# Patient Record
Sex: MALE | Race: ASIAN | NOT HISPANIC OR LATINO | Employment: FULL TIME | URBAN - METROPOLITAN AREA
[De-identification: names, ages, dates, MRNs, and addresses within clinical notes are randomized per-mention and may not be internally consistent; named-entity substitution may affect disease eponyms.]

---

## 2019-02-16 ENCOUNTER — APPOINTMENT (OUTPATIENT)
Dept: RADIOLOGY | Age: 24
End: 2019-02-16
Attending: PHYSICIAN ASSISTANT
Payer: COMMERCIAL

## 2019-02-16 ENCOUNTER — OFFICE VISIT (OUTPATIENT)
Dept: URGENT CARE | Age: 24
End: 2019-02-16
Payer: COMMERCIAL

## 2019-02-16 VITALS
WEIGHT: 130 LBS | OXYGEN SATURATION: 97 % | SYSTOLIC BLOOD PRESSURE: 132 MMHG | HEART RATE: 91 BPM | HEIGHT: 68 IN | RESPIRATION RATE: 18 BRPM | BODY MASS INDEX: 19.7 KG/M2 | TEMPERATURE: 98.7 F | DIASTOLIC BLOOD PRESSURE: 54 MMHG

## 2019-02-16 DIAGNOSIS — S52.501A CLOSED FRACTURE OF DISTAL END OF RIGHT RADIUS, UNSPECIFIED FRACTURE MORPHOLOGY, INITIAL ENCOUNTER: ICD-10-CM
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
## 2019-02-16 DIAGNOSIS — S69.92XA INJURY OF LEFT WRIST, INITIAL ENCOUNTER: ICD-10-CM

## 2019-02-16 DIAGNOSIS — S62.031A CLOSED DISPLACED FRACTURE OF PROXIMAL THIRD OF SCAPHOID BONE OF RIGHT WRIST, INITIAL ENCOUNTER: Primary | ICD-10-CM

## 2019-02-16 PROCEDURE — 73130 X-RAY EXAM OF HAND: CPT

## 2019-02-16 PROCEDURE — 29125 APPL SHORT ARM SPLINT STATIC: CPT | Performed by: PHYSICIAN ASSISTANT

## 2019-02-16 PROCEDURE — G0383 LEV 4 HOSP TYPE B ED VISIT: HCPCS | Performed by: FAMILY MEDICINE

## 2019-02-16 PROCEDURE — 73110 X-RAY EXAM OF WRIST: CPT

## 2019-02-16 NOTE — PATIENT INSTRUCTIONS
Rest injured body part(s)  Ice - Cold compresses 10 min every 2-3 hours while awake for the 1st 48 hr   After 48 hr may do warm compresses for comfort if needed  Compression with Ace wrap and/or splint for protection and comfort  Do not wear any Ace wraps to bed  Elevate injured body part(s) if able  Tylenol as needed for pain  May also use ibuprofen if needed      Call orthopedist as soon as possible and arrange follow-up evaluation for broken wrist

## 2019-02-16 NOTE — PROGRESS NOTES
[facility]    NAME: Sergio Bell is a 24 y o  male  : 1995    MRN: [id number]  DATE: 2019  TIME: 3:26 PM    Assessment and Plan   Closed displaced fracture of proximal third of scaphoid bone of right wrist, initial encounter [[id number]]  1  Closed displaced fracture of proximal third of scaphoid bone of right wrist, initial encounter  XR hand 3+ vw right    XR wrist 3+ vw right   2  Fall, initial encounter     3  Closed fracture of distal end of right radius, unspecified fracture morphology, initial encounter       X-ray right hand:  Navicular fracture with some displacement  X-ray right wrist:  Possible distal radial fracture  Faint lucency seen that could indicate acute trauma  Await radiologist's final test results  Request x-ray disc to be made and given to patient to take with him to orthopedist in area where he lives  Static thumb spica splint applied to patient's right hand and wrist by nursing staff  Patient Instructions   Patient Instructions     Rest injured body part(s)  Ice - Cold compresses 10 min every 2-3 hours while awake for the 1st 48 hr   After 48 hr may do warm compresses for comfort if needed  Compression with Ace wrap and/or splint for protection and comfort  Do not wear any Ace wraps to bed  Elevate injured body part(s) if able  Tylenol as needed for pain  May also use ibuprofen if needed  Call orthopedist as soon as possible and arrange follow-up evaluation for          Chief Complaint     Chief Complaint   Patient presents with    Wrist Injury     pt reports falling while at [facility] today  c/o right wrist pain  History of Present Illness   Sergio Bell presents to the clinic c/o  21-year-old right-hand-dominant male injured right hand wrist when he was skiing today  [place] and landed on his right wrist   Hurts to move  He has been in a splint and has been using ice  He also is sore on his bottom from falling    Feels like he limps a little bit when walking but thinks he is okay overall  Patient is from the [facility]  Review of Systems   Review of Systems   Constitutional: Positive for activity change  Negative for appetite change, chills and fever  Musculoskeletal: Positive for arthralgias, gait problem and joint swelling  Skin: Negative for color change  Neurological: Positive for numbness  Right arm felt numb with splint on       Current Medications     No long-term medications on file  Current Allergies     Allergies as of 02/16/2019    (No Known Allergies)          The following portions of the patient's history were reviewed and updated as appropriate: allergies, current medications, past family history, past medical history, past social history, past surgical history and problem list   History reviewed  No pertinent past medical history  History reviewed  No pertinent surgical history  History reviewed  No pertinent family history  Objective   /54 (BP Location: Left arm, Patient Position: Sitting)   Pulse 91   Temp 98 7 °F (37 1 °C) (Temporal)   Resp 18   Ht 5' 8" (1 727 m)   Wt 59 kg (130 lb)   SpO2 97%   BMI 19 77 kg/m²   No LMP for male patient  Physical Exam     Physical Exam   Constitutional: He is oriented to person, place, and time  He appears well-developed and well-nourished  No distress  HENT:   Head: Normocephalic and atraumatic  Neck: Normal range of motion  Neck supple  Cardiovascular: Normal rate and regular rhythm  Pulmonary/Chest: Effort normal    Musculoskeletal:        Arms:       Legs:  Neurological: He is alert and oriented to person, place, and time  Skin: Skin is warm and dry  He is not diaphoretic  Psychiatric: He has a normal mood and affect

## 2019-02-17 ENCOUNTER — TELEPHONE (OUTPATIENT)
Dept: URGENT CARE | Age: 24
End: 2019-02-17

## 2019-02-17 NOTE — TELEPHONE ENCOUNTER
Call to patient  Message left that radiologist's final results mention the scaphoid fracture and possible dislocation in the wrist   Also noted on x-ray is a fracture of the right mid finger distal phalanx  If the splint does not cover that I would recommend criss taping that finger to the index or ring finger for some protection and support  Again I encouraged him to follow up with an orthopedist as soon as possible